# Patient Record
Sex: MALE | Race: WHITE | NOT HISPANIC OR LATINO | Employment: OTHER | URBAN - METROPOLITAN AREA
[De-identification: names, ages, dates, MRNs, and addresses within clinical notes are randomized per-mention and may not be internally consistent; named-entity substitution may affect disease eponyms.]

---

## 2017-02-20 NOTE — PATIENT DISCUSSION
Non Proliferative Diabetic Counseling: I have discussed with the patient the importance of controlling blood glucose, blood pressure and lipid levels to minimize the risk of progressing retinal complications from diabetes. I explained the importance of annual dilated eye exams because effective treatment of retinal complications depends on timely intervention. The patient was instructed to call or return sooner if they noticed blurred or distorted vision, fluctuating vision, pain or redness around one or both eyes. Return for follow-up as scheduled.

## 2017-02-20 NOTE — PATIENT DISCUSSION
NONPROLIFERATIVE DIABETIC RETINOPATHY:ENCOURAGE GOOD BLOOD SUGAR AND BLOOD PRESSURE CONTROL.  RETURN FOR FOLLOW-UP AS SCHEDULED FOR DILATED FUNDUS EXAM.

## 2017-02-20 NOTE — PATIENT DISCUSSION
MODERATE DRY EYES: PRESCRIBED PRESERVATIVE FREE ARTIFICIAL TEARS BID &ndash; QID4-6X A DAY, OU AND THE DAILY INTAKE OF OMEGA-3 DHA/EPA FATTY ACIDS TO HELP RELIEVE SYMPTOMS. ADD NIGHTLY LUBRICATING OINTMENT OR GEL.

## 2017-02-20 NOTE — PATIENT DISCUSSION
POAG, OU: INTRAOCULAR PRESSURE IS WITHIN ACCEPTABLE LIMITS. PT INSTRUCTED TO CONTINUE COMBIGAN BID AND RETURN FOR FOLLOW-UP AS SCHEDULED.

## 2017-07-13 ENCOUNTER — ESTABLISHED PATIENT (OUTPATIENT)
Dept: URBAN - METROPOLITAN AREA CLINIC 39 | Facility: CLINIC | Age: 72
End: 2017-07-13

## 2017-07-13 DIAGNOSIS — H43.391: ICD-10-CM

## 2017-07-13 DIAGNOSIS — H25.9: ICD-10-CM

## 2017-07-13 PROCEDURE — G8785 BP SCRN NO PERF AT INTERVAL: HCPCS

## 2017-07-13 PROCEDURE — 1036F TOBACCO NON-USER: CPT

## 2017-07-13 PROCEDURE — 92015 DETERMINE REFRACTIVE STATE: CPT

## 2017-07-13 PROCEDURE — 92014 COMPRE OPH EXAM EST PT 1/>: CPT

## 2017-07-13 PROCEDURE — G8428 CUR MEDS NOT DOCUMENT: HCPCS

## 2017-07-13 ASSESSMENT — VISUAL ACUITY
OD_CC: 20/20-1
OS_CC: J2
OS_CC: 20/30-2
OD_SC: 20/80-1
OD_CC: J6
OS_SC: J1
OD_SC: J1
OS_SC: 20/80-1

## 2017-07-13 ASSESSMENT — TONOMETRY
OS_IOP_MMHG: 16
OD_IOP_MMHG: 16

## 2017-09-26 NOTE — PATIENT DISCUSSION
CONJUNCTIVITIS (VIRAL), OD:  FLUSHED WITH 5% BETADINE TODAY. PRESCRIBE AGGRESIVE LUBRICATION WITH PF AT'S. STOP ANTIBIOTIC GTTS. RETURN FOR FOLLOW-UP AS SCHEDULED.

## 2018-06-28 ENCOUNTER — ESTABLISHED COMPREHENSIVE EXAM (OUTPATIENT)
Dept: URBAN - METROPOLITAN AREA CLINIC 39 | Facility: CLINIC | Age: 73
End: 2018-06-28

## 2018-06-28 DIAGNOSIS — H43.811: ICD-10-CM

## 2018-06-28 DIAGNOSIS — H25.811: ICD-10-CM

## 2018-06-28 DIAGNOSIS — H25.812: ICD-10-CM

## 2018-06-28 DIAGNOSIS — H04.123: ICD-10-CM

## 2018-06-28 PROCEDURE — 92014 COMPRE OPH EXAM EST PT 1/>: CPT

## 2018-06-28 PROCEDURE — 92015 DETERMINE REFRACTIVE STATE: CPT

## 2018-06-28 PROCEDURE — 1036F TOBACCO NON-USER: CPT

## 2018-06-28 PROCEDURE — G8427 DOCREV CUR MEDS BY ELIG CLIN: HCPCS

## 2018-06-28 ASSESSMENT — TONOMETRY
OS_IOP_MMHG: 14
OD_IOP_MMHG: 13

## 2018-06-28 ASSESSMENT — VISUAL ACUITY
OS_SC: 20/100+1
OD_SC: 20/100-1
OS_CC: J1
OD_SC: J1+
OD_CC: 20/30-2
OU_CC: J1+
OU_SC: J1+
OU_SC: 20/80-1+2
OS_BAT: 20/100
OS_SC: J1+
OD_CC: J3
OD_BAT: 20/80
OU_CC: 20/30-2
OS_CC: 20/60-1

## 2018-07-30 ENCOUNTER — REFRACTION ONLY (OUTPATIENT)
Dept: URBAN - METROPOLITAN AREA CLINIC 39 | Facility: CLINIC | Age: 73
End: 2018-07-30

## 2018-07-30 DIAGNOSIS — H25.811: ICD-10-CM

## 2018-07-30 DIAGNOSIS — H25.812: ICD-10-CM

## 2018-07-30 PROCEDURE — 92015GRNC REFRACTION GLASSES RECHECK - NO CHARGE

## 2018-07-30 ASSESSMENT — VISUAL ACUITY
OD_SC: 20/80-1
OS_CC: J3-1
OS_SC: J1
OD_SC: J1
OS_CC: 20/25+2
OD_CC: J5
OS_SC: 20/80+1
OD_CC: 20/20

## 2018-07-30 ASSESSMENT — TONOMETRY
OD_IOP_MMHG: 14
OS_IOP_MMHG: 14

## 2019-07-12 ENCOUNTER — ESTABLISHED COMPREHENSIVE EXAM (OUTPATIENT)
Dept: URBAN - METROPOLITAN AREA CLINIC 39 | Facility: CLINIC | Age: 74
End: 2019-07-12

## 2019-07-12 DIAGNOSIS — H25.812: ICD-10-CM

## 2019-07-12 DIAGNOSIS — H25.811: ICD-10-CM

## 2019-07-12 DIAGNOSIS — H43.811: ICD-10-CM

## 2019-07-12 DIAGNOSIS — H04.123: ICD-10-CM

## 2019-07-12 PROCEDURE — 92014 COMPRE OPH EXAM EST PT 1/>: CPT

## 2019-07-12 PROCEDURE — 92015 DETERMINE REFRACTIVE STATE: CPT

## 2019-07-12 ASSESSMENT — VISUAL ACUITY
OS_CC: 20/30-2
OU_CC: 20/25-1
OS_SC: J1+
OD_CC: 20/25+1
OU_SC: J1+
OD_SC: J1+

## 2019-07-12 ASSESSMENT — TONOMETRY
OD_IOP_MMHG: 14
OS_IOP_MMHG: 13

## 2019-12-30 NOTE — PATIENT DISCUSSION
EXUDATIVE ARMD OU - FOLLOWED BY DR Robert Ramirez. NO SRF. 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  AREDS/AMSLER

## 2019-12-30 NOTE — PATIENT DISCUSSION
POAG, OU: INTRAOCULAR PRESSURE IS WITHIN ACCEPTABLE LIMITS. PT INSTRUCTED TO CONTINUE COMBIGAN BID AND RETURN FOR FOLLOW-UP AS SCHEDULED. IF IOP REMAINS LOW, CONSIDER ALTERNATIVE.

## 2020-01-06 ENCOUNTER — ESTABLISHED COMPREHENSIVE EXAM (OUTPATIENT)
Dept: URBAN - METROPOLITAN AREA CLINIC 39 | Facility: CLINIC | Age: 75
End: 2020-01-06

## 2020-01-06 DIAGNOSIS — H04.123: ICD-10-CM

## 2020-01-06 DIAGNOSIS — H25.812: ICD-10-CM

## 2020-01-06 DIAGNOSIS — H25.811: ICD-10-CM

## 2020-01-06 DIAGNOSIS — H43.811: ICD-10-CM

## 2020-01-06 PROCEDURE — 92014 COMPRE OPH EXAM EST PT 1/>: CPT

## 2020-01-06 PROCEDURE — 92015 DETERMINE REFRACTIVE STATE: CPT

## 2020-01-06 ASSESSMENT — VISUAL ACUITY
OS_SC: 20/100
OD_SC: J1
OD_SC: 20/200
OS_SC: J1

## 2020-01-06 ASSESSMENT — TONOMETRY
OS_IOP_MMHG: 14
OD_IOP_MMHG: 13

## 2020-01-31 ENCOUNTER — REFRACTION ONLY (OUTPATIENT)
Dept: URBAN - METROPOLITAN AREA CLINIC 39 | Facility: CLINIC | Age: 75
End: 2020-01-31

## 2020-01-31 DIAGNOSIS — H25.811: ICD-10-CM

## 2020-01-31 DIAGNOSIS — H04.123: ICD-10-CM

## 2020-01-31 DIAGNOSIS — H25.812: ICD-10-CM

## 2020-01-31 DIAGNOSIS — H52.203: ICD-10-CM

## 2020-01-31 DIAGNOSIS — H25.89: ICD-10-CM

## 2020-01-31 DIAGNOSIS — H52.4: ICD-10-CM

## 2020-01-31 DIAGNOSIS — H52.13: ICD-10-CM

## 2020-01-31 DIAGNOSIS — H43.811: ICD-10-CM

## 2020-01-31 PROCEDURE — 92015GRNC REFRACTION GLASSES RECHECK - NO CHARGE

## 2020-07-01 ENCOUNTER — REFRACTION ONLY (OUTPATIENT)
Dept: URBAN - METROPOLITAN AREA CLINIC 39 | Facility: CLINIC | Age: 75
End: 2020-07-01

## 2020-07-01 DIAGNOSIS — H52.4: ICD-10-CM

## 2020-07-01 DIAGNOSIS — H52.203: ICD-10-CM

## 2020-07-01 DIAGNOSIS — H52.13: ICD-10-CM

## 2020-07-01 PROCEDURE — 92015GRNC REFRACTION GLASSES RECHECK - NO CHARGE

## 2020-07-01 ASSESSMENT — VISUAL ACUITY
OS_CC: 20/25
OS_SC: J1
OD_SC: J1-
OD_CC: 20/25-2

## 2020-07-06 NOTE — PATIENT DISCUSSION
EXUDATIVE ARMD OU - FOLLOWED BY DR Hiwot Harris. NO SRF. 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  AREDS/AMSLER

## 2021-01-05 NOTE — PATIENT DISCUSSION
POAG, OU: INTRAOCULAR PRESSURE IS WITHIN ACCEPTABLE LIMITS. PT INSTRUCTED TO SWITCH TO LATANOPROST OU QHS.

## 2021-01-05 NOTE — PATIENT DISCUSSION
EXUDATIVE ARMD OU - FOLLOWED BY DR Sheridan Co. NO SRF. 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  AREDS/JAZMIN

## 2021-01-05 NOTE — PATIENT DISCUSSION
uchs' Endothelial Dystrophy Counseling: The diagnosis and its pathophysiology was explained to the patient. As long as the patient is asymptomatic, no treatment is necessary. The patient was advised of the possibility of decreased vision over time secondary to corneal edema. The patient was advised to return for further evaluation if they have fluctuating vision and/or a foreign body sensation. The possible need for a future corneal transplant was explained.

## 2021-01-22 ENCOUNTER — ESTABLISHED COMPREHENSIVE EXAM (OUTPATIENT)
Dept: URBAN - METROPOLITAN AREA CLINIC 39 | Facility: CLINIC | Age: 76
End: 2021-01-22

## 2021-01-22 DIAGNOSIS — H25.812: ICD-10-CM

## 2021-01-22 DIAGNOSIS — H04.123: ICD-10-CM

## 2021-01-22 DIAGNOSIS — H25.811: ICD-10-CM

## 2021-01-22 DIAGNOSIS — H43.811: ICD-10-CM

## 2021-01-22 PROCEDURE — 92015 DETERMINE REFRACTIVE STATE: CPT

## 2021-01-22 PROCEDURE — 92014 COMPRE OPH EXAM EST PT 1/>: CPT

## 2021-01-22 ASSESSMENT — TONOMETRY
OD_IOP_MMHG: 15
OS_IOP_MMHG: 15

## 2021-01-22 ASSESSMENT — VISUAL ACUITY
OD_SC: J1
OS_SC: 20/400
OD_SC: 20/400
OD_CC: 20/25
OS_SC: J1
OS_CC: 20/25+1

## 2021-11-12 NOTE — PATIENT DISCUSSION
Advised Amsler grid, AREDS-2 Vitamins, green leafy vegetables, UV protection, no smoking, and BP control.

## 2022-02-11 ENCOUNTER — COMPREHENSIVE EXAM (OUTPATIENT)
Dept: URBAN - METROPOLITAN AREA CLINIC 39 | Facility: CLINIC | Age: 77
End: 2022-02-11

## 2022-02-11 DIAGNOSIS — H52.4: ICD-10-CM

## 2022-02-11 DIAGNOSIS — H43.811: ICD-10-CM

## 2022-02-11 DIAGNOSIS — H25.89: ICD-10-CM

## 2022-02-11 DIAGNOSIS — H52.13: ICD-10-CM

## 2022-02-11 DIAGNOSIS — H04.123: ICD-10-CM

## 2022-02-11 DIAGNOSIS — H25.812: ICD-10-CM

## 2022-02-11 DIAGNOSIS — H25.811: ICD-10-CM

## 2022-02-11 DIAGNOSIS — H52.203: ICD-10-CM

## 2022-02-11 PROCEDURE — 92015 DETERMINE REFRACTIVE STATE: CPT

## 2022-02-11 PROCEDURE — 92014 COMPRE OPH EXAM EST PT 1/>: CPT

## 2022-02-11 ASSESSMENT — VISUAL ACUITY
OD_SC: 20/100-1
OD_SC: J1+
OU_SC: 20/100-1
OU_SC: J1+
OD_CC: 20/25
OS_CC: 20/25-1
OU_CC: 20/25-1
OS_SC: J1+
OS_SC: 20/100-1

## 2022-02-11 ASSESSMENT — TONOMETRY
OD_IOP_MMHG: 21
OS_IOP_MMHG: 19

## 2022-03-04 ENCOUNTER — EMERGENCY VISIT (OUTPATIENT)
Dept: URBAN - METROPOLITAN AREA CLINIC 39 | Facility: CLINIC | Age: 77
End: 2022-03-04

## 2022-03-04 DIAGNOSIS — H04.123: ICD-10-CM

## 2022-03-04 DIAGNOSIS — H02.88A: ICD-10-CM

## 2022-03-04 DIAGNOSIS — H02.88B: ICD-10-CM

## 2022-03-04 PROCEDURE — A4262 TEMPORARY TEAR DUCT PLUG: HCPCS

## 2022-03-04 PROCEDURE — 92012 INTRM OPH EXAM EST PATIENT: CPT

## 2022-03-04 PROCEDURE — 68761Q PUNCTAL PLUG/QUINTESS DISSOLVABLE PLUG/EACH

## 2022-03-04 ASSESSMENT — VISUAL ACUITY
OS_CC: 20/25
OD_CC: 20/25-1
OU_CC: 20/25

## 2022-03-04 ASSESSMENT — TONOMETRY
OS_IOP_MMHG: 16
OD_IOP_MMHG: 16

## 2024-07-09 ENCOUNTER — COMPREHENSIVE EXAM (OUTPATIENT)
Dept: URBAN - METROPOLITAN AREA CLINIC 38 | Facility: CLINIC | Age: 79
End: 2024-07-09

## 2024-07-09 DIAGNOSIS — H02.88B: ICD-10-CM

## 2024-07-09 DIAGNOSIS — H02.88A: ICD-10-CM

## 2024-07-09 DIAGNOSIS — H52.4: ICD-10-CM

## 2024-07-09 DIAGNOSIS — H52.13: ICD-10-CM

## 2024-07-09 DIAGNOSIS — H52.203: ICD-10-CM

## 2024-07-09 DIAGNOSIS — H43.813: ICD-10-CM

## 2024-07-09 DIAGNOSIS — H25.813: ICD-10-CM

## 2024-07-09 DIAGNOSIS — H04.123: ICD-10-CM

## 2024-07-09 DIAGNOSIS — H21.233: ICD-10-CM

## 2024-07-09 PROCEDURE — 92014 COMPRE OPH EXAM EST PT 1/>: CPT

## 2024-07-09 PROCEDURE — 92015 DETERMINE REFRACTIVE STATE: CPT

## 2024-07-09 ASSESSMENT — TONOMETRY
OS_IOP_MMHG: 18
OD_IOP_MMHG: 19

## 2024-07-09 ASSESSMENT — VISUAL ACUITY
OU_CC: J1
OD_CC: 20/20-1
OS_CC: 20/20-1
OD_CC: J2+
OS_CC: J1
OU_CC: 20/20-2

## 2025-08-06 ENCOUNTER — COMPREHENSIVE EXAM (OUTPATIENT)
Age: 80
End: 2025-08-06

## 2025-08-06 DIAGNOSIS — H04.123: ICD-10-CM

## 2025-08-06 DIAGNOSIS — H25.813: ICD-10-CM

## 2025-08-06 DIAGNOSIS — H02.88A: ICD-10-CM

## 2025-08-06 DIAGNOSIS — H52.13: ICD-10-CM

## 2025-08-06 DIAGNOSIS — H02.88B: ICD-10-CM

## 2025-08-06 DIAGNOSIS — H52.203: ICD-10-CM

## 2025-08-06 DIAGNOSIS — H52.4: ICD-10-CM

## 2025-08-06 DIAGNOSIS — H21.233: ICD-10-CM

## 2025-08-06 DIAGNOSIS — H43.813: ICD-10-CM

## 2025-08-06 PROCEDURE — 92015 DETERMINE REFRACTIVE STATE: CPT

## 2025-08-06 PROCEDURE — 92014 COMPRE OPH EXAM EST PT 1/>: CPT
